# Patient Record
Sex: MALE | Race: BLACK OR AFRICAN AMERICAN | NOT HISPANIC OR LATINO | Employment: UNEMPLOYED | URBAN - METROPOLITAN AREA
[De-identification: names, ages, dates, MRNs, and addresses within clinical notes are randomized per-mention and may not be internally consistent; named-entity substitution may affect disease eponyms.]

---

## 2021-07-25 PROBLEM — I25.10 CAD (CORONARY ARTERY DISEASE): Status: ACTIVE | Noted: 2021-07-25

## 2021-07-25 PROBLEM — R79.89 ELEVATED TROPONIN: Status: ACTIVE | Noted: 2021-07-25

## 2021-07-25 PROBLEM — I10 HTN (HYPERTENSION): Status: ACTIVE | Noted: 2021-07-25

## 2021-07-25 PROBLEM — R79.89 ELEVATED TROPONIN: Status: RESOLVED | Noted: 2021-07-25 | Resolved: 2021-07-25

## 2021-07-25 PROBLEM — N17.9 AKI (ACUTE KIDNEY INJURY): Status: ACTIVE | Noted: 2021-07-25

## 2021-07-25 PROBLEM — I24.9 ACS (ACUTE CORONARY SYNDROME): Status: ACTIVE | Noted: 2021-07-25

## 2021-07-26 PROBLEM — R79.89 ELEVATED TROPONIN: Status: ACTIVE | Noted: 2021-07-26

## 2021-07-27 PROBLEM — N17.9 AKI (ACUTE KIDNEY INJURY): Status: RESOLVED | Noted: 2021-07-25 | Resolved: 2021-07-27

## 2021-07-27 PROBLEM — R79.89 ELEVATED TROPONIN: Status: RESOLVED | Noted: 2021-07-26 | Resolved: 2021-07-27

## 2021-07-28 PROBLEM — I24.9 ACS (ACUTE CORONARY SYNDROME): Status: RESOLVED | Noted: 2021-07-25 | Resolved: 2021-07-28

## 2021-08-09 PROBLEM — K62.5 RECTAL BLEEDING: Status: ACTIVE | Noted: 2021-08-09

## 2021-08-09 PROBLEM — M67.431 GANGLION CYST OF DORSUM OF RIGHT WRIST: Status: ACTIVE | Noted: 2021-08-09

## 2021-08-10 PROBLEM — Z21 HIV ANTIBODY POSITIVE: Status: ACTIVE | Noted: 2021-08-10

## 2021-08-13 ENCOUNTER — PATIENT OUTREACH (OUTPATIENT)
Dept: ADMINISTRATIVE | Facility: CLINIC | Age: 61
End: 2021-08-13

## 2021-08-19 PROBLEM — M25.572 ACUTE LEFT ANKLE PAIN: Status: ACTIVE | Noted: 2021-08-19

## 2021-08-19 PROBLEM — S82.62XA CLOSED LOW LATERAL MALLEOLUS FRACTURE, LEFT, INITIAL ENCOUNTER: Status: ACTIVE | Noted: 2021-08-19

## 2021-08-19 PROBLEM — M67.431 GANGLION CYST OF DORSUM OF RIGHT WRIST: Status: RESOLVED | Noted: 2021-08-09 | Resolved: 2021-08-19

## 2021-09-01 PROBLEM — M54.16 LUMBAR RADICULOPATHY: Status: ACTIVE | Noted: 2021-09-01

## 2021-12-05 PROBLEM — I13.10 CARDIORENAL SYNDROME: Status: ACTIVE | Noted: 2021-12-05

## 2021-12-05 PROBLEM — E87.1 HYPONATREMIA: Status: ACTIVE | Noted: 2021-12-05

## 2021-12-13 PROBLEM — B20 HIV DISEASE: Status: ACTIVE | Noted: 2021-12-13

## 2021-12-13 PROBLEM — R07.9 CHEST PAIN: Status: ACTIVE | Noted: 2021-12-13

## 2021-12-13 PROBLEM — I25.10 CAD (CORONARY ARTERY DISEASE): Status: ACTIVE | Noted: 2021-12-13

## 2021-12-13 PROBLEM — Z95.1 S/P CABG (CORONARY ARTERY BYPASS GRAFT): Status: ACTIVE | Noted: 2021-12-13

## 2021-12-13 PROBLEM — Z98.890 S/P LEFT HEART CATHETERIZATION BY PERCUTANEOUS APPROACH: Status: ACTIVE | Noted: 2021-12-13

## 2021-12-15 PROBLEM — Z21 ASYMPTOMATIC HIV INFECTION, WITH NO HISTORY OF HIV-RELATED ILLNESS: Status: ACTIVE | Noted: 2021-12-13

## 2021-12-15 PROBLEM — R07.9 CHEST PAIN: Status: RESOLVED | Noted: 2021-12-13 | Resolved: 2021-12-15

## 2021-12-27 DIAGNOSIS — U07.1 COVID-19 VIRUS DETECTED: ICD-10-CM

## 2022-02-20 PROBLEM — M79.604 RIGHT LEG PAIN: Status: ACTIVE | Noted: 2022-02-20

## 2022-04-27 PROBLEM — M54.9 CHRONIC BACK PAIN: Status: ACTIVE | Noted: 2022-04-27

## 2022-04-27 PROBLEM — G89.29 CHRONIC BACK PAIN: Status: ACTIVE | Noted: 2022-04-27

## 2022-06-16 PROBLEM — R29.898 WEAKNESS OF BOTH LOWER EXTREMITIES: Status: ACTIVE | Noted: 2022-06-16

## 2022-06-16 PROBLEM — Z74.09 DECREASED FUNCTIONAL MOBILITY AND ENDURANCE: Status: ACTIVE | Noted: 2022-06-16

## 2022-07-12 PROBLEM — E78.00 HIGH CHOLESTEROL: Status: ACTIVE | Noted: 2022-07-12

## 2022-07-12 PROBLEM — Z76.0 MEDICATION REFILL: Status: ACTIVE | Noted: 2022-07-12

## 2023-04-13 DIAGNOSIS — Z12.11 ENCOUNTER FOR COLORECTAL CANCER SCREENING: Primary | ICD-10-CM

## 2023-04-13 DIAGNOSIS — Z12.12 ENCOUNTER FOR COLORECTAL CANCER SCREENING: Primary | ICD-10-CM

## 2024-04-21 PROBLEM — K40.90 UNILATERAL INGUINAL HERNIA WITHOUT OBSTRUCTION OR GANGRENE: Status: ACTIVE | Noted: 2024-04-21

## 2024-04-21 PROBLEM — I72.3 ILIAC ARTERY ANEURYSM: Status: ACTIVE | Noted: 2024-04-21

## 2024-08-13 ENCOUNTER — PATIENT OUTREACH (OUTPATIENT)
Dept: ADMINISTRATIVE | Facility: HOSPITAL | Age: 64
End: 2024-08-13

## 2024-08-13 NOTE — PROGRESS NOTES
Population Health Chart Review & Patient Outreach Details      Additional Pop Health Notes:      DUE FOR LDCT LUNG SCREENING.   Orders in patient need to call to schedule.   Notified person on phone to call back or talk to provider at next visit to advise.          Updates Requested / Reviewed:      Updated Care Coordination Note and Care Everywhere         Health Maintenance Topics Overdue:      VB Score: 2     Colon Cancer Screening  LDCT Lung Screen    Tetanus Vaccine  Shingles/Zoster Vaccine  RSV Vaccine                  Health Maintenance Topic(s) Outreach Outcomes & Actions Taken:    Low Dose CT Screening - Outreach Outcomes & Actions Taken  : Notified person on phone to call back or talk to provider at next visit to advise.